# Patient Record
Sex: FEMALE | URBAN - METROPOLITAN AREA
[De-identification: names, ages, dates, MRNs, and addresses within clinical notes are randomized per-mention and may not be internally consistent; named-entity substitution may affect disease eponyms.]

---

## 2022-01-09 ENCOUNTER — NURSE TRIAGE (OUTPATIENT)
Dept: ADMINISTRATIVE | Facility: CLINIC | Age: 27
End: 2022-01-09

## 2022-01-09 NOTE — TELEPHONE ENCOUNTER
Reason for Disposition   Shock suspected (e.g., cold/pale/clammy skin, too weak to stand, low BP, rapid pulse)    Additional Information   Negative: Passed out (i.e., lost consciousness, collapsed and was not responding)    Protocols used: BACK PAIN-A-AH    Severe back pain and crying. Patient advised to call 911 because she was sobbing and states her back is burning and she can barely walk. Doesn't report injury.